# Patient Record
Sex: FEMALE | Race: WHITE | NOT HISPANIC OR LATINO
[De-identification: names, ages, dates, MRNs, and addresses within clinical notes are randomized per-mention and may not be internally consistent; named-entity substitution may affect disease eponyms.]

---

## 2018-05-18 ENCOUNTER — TRANSCRIPTION ENCOUNTER (OUTPATIENT)
Age: 28
End: 2018-05-18

## 2018-06-20 ENCOUNTER — APPOINTMENT (OUTPATIENT)
Dept: ORTHOPEDIC SURGERY | Facility: CLINIC | Age: 28
End: 2018-06-20
Payer: COMMERCIAL

## 2018-06-20 DIAGNOSIS — S00.93XA CONTUSION OF UNSPECIFIED PART OF HEAD, INITIAL ENCOUNTER: ICD-10-CM

## 2018-06-20 PROCEDURE — 99204 OFFICE O/P NEW MOD 45 MIN: CPT

## 2018-10-19 RX ORDER — NORETHINDRONE ACETATE AND ETHINYL ESTRADIOL, ETHINYL ESTRADIOL AND FERROUS FUMARATE 1MG-10(24)
1 MG-10 MCG / KIT ORAL
Qty: 28 | Refills: 0 | Status: COMPLETED | COMMUNITY
Start: 2017-08-24 | End: 2017-10-19

## 2024-12-27 ENCOUNTER — ASOB RESULT (OUTPATIENT)
Age: 34
End: 2024-12-27

## 2024-12-27 ENCOUNTER — APPOINTMENT (OUTPATIENT)
Dept: ANTEPARTUM | Facility: CLINIC | Age: 34
End: 2024-12-27
Payer: COMMERCIAL

## 2024-12-27 PROCEDURE — 76817 TRANSVAGINAL US OBSTETRIC: CPT

## 2024-12-27 PROCEDURE — 76805 OB US >/= 14 WKS SNGL FETUS: CPT

## 2025-01-30 ENCOUNTER — APPOINTMENT (OUTPATIENT)
Dept: ANTEPARTUM | Facility: CLINIC | Age: 35
End: 2025-01-30
Payer: COMMERCIAL

## 2025-01-30 ENCOUNTER — ASOB RESULT (OUTPATIENT)
Age: 35
End: 2025-01-30

## 2025-01-30 PROCEDURE — 76946 ECHO GUIDE FOR AMNIOCENTESIS: CPT

## 2025-01-30 PROCEDURE — 59000 AMNIOCENTESIS DIAGNOSTIC: CPT

## 2025-01-30 PROCEDURE — 76811 OB US DETAILED SNGL FETUS: CPT

## 2025-01-30 PROCEDURE — 76817 TRANSVAGINAL US OBSTETRIC: CPT

## 2025-06-13 ENCOUNTER — OUTPATIENT (OUTPATIENT)
Dept: OUTPATIENT SERVICES | Facility: HOSPITAL | Age: 35
LOS: 1 days | End: 2025-06-13
Payer: COMMERCIAL

## 2025-06-13 DIAGNOSIS — Z34.01 ENCOUNTER FOR SUPERVISION OF NORMAL FIRST PREGNANCY, FIRST TRIMESTER: ICD-10-CM

## 2025-06-13 PROCEDURE — 86901 BLOOD TYPING SEROLOGIC RH(D): CPT

## 2025-06-13 PROCEDURE — 86900 BLOOD TYPING SEROLOGIC ABO: CPT

## 2025-06-13 PROCEDURE — 86850 RBC ANTIBODY SCREEN: CPT

## 2025-06-16 ENCOUNTER — ASOB RESULT (OUTPATIENT)
Age: 35
End: 2025-06-16

## 2025-06-16 ENCOUNTER — EMERGENCY (EMERGENCY)
Facility: HOSPITAL | Age: 35
LOS: 1 days | End: 2025-06-16
Attending: EMERGENCY MEDICINE | Admitting: EMERGENCY MEDICINE
Payer: COMMERCIAL

## 2025-06-16 ENCOUNTER — APPOINTMENT (OUTPATIENT)
Dept: ANTEPARTUM | Facility: CLINIC | Age: 35
End: 2025-06-16
Payer: COMMERCIAL

## 2025-06-16 VITALS
TEMPERATURE: 98 F | HEART RATE: 78 BPM | OXYGEN SATURATION: 99 % | DIASTOLIC BLOOD PRESSURE: 75 MMHG | RESPIRATION RATE: 18 BRPM | SYSTOLIC BLOOD PRESSURE: 111 MMHG

## 2025-06-16 VITALS
OXYGEN SATURATION: 95 % | RESPIRATION RATE: 16 BRPM | HEART RATE: 90 BPM | SYSTOLIC BLOOD PRESSURE: 145 MMHG | WEIGHT: 136.91 LBS | HEIGHT: 64 IN | DIASTOLIC BLOOD PRESSURE: 81 MMHG | TEMPERATURE: 99 F

## 2025-06-16 LAB
ALBUMIN SERPL ELPH-MCNC: 4.5 G/DL — SIGNIFICANT CHANGE UP (ref 3.3–5)
ALP SERPL-CCNC: 49 U/L — SIGNIFICANT CHANGE UP (ref 40–120)
ALT FLD-CCNC: 14 U/L — SIGNIFICANT CHANGE UP (ref 10–45)
AST SERPL-CCNC: 19 U/L — SIGNIFICANT CHANGE UP (ref 10–40)
BILIRUB DIRECT SERPL-MCNC: <0.1 MG/DL — SIGNIFICANT CHANGE UP (ref 0–0.3)
BILIRUB INDIRECT FLD-MCNC: SIGNIFICANT CHANGE UP (ref 0.2–1)
BILIRUB SERPL-MCNC: 0.3 MG/DL — SIGNIFICANT CHANGE UP (ref 0.2–1.2)
HCG SERPL-ACNC: 94 MIU/ML — HIGH
PROT SERPL-MCNC: 7.7 G/DL — SIGNIFICANT CHANGE UP (ref 6–8.3)

## 2025-06-16 PROCEDURE — 76830 TRANSVAGINAL US NON-OB: CPT

## 2025-06-16 PROCEDURE — 84702 CHORIONIC GONADOTROPIN TEST: CPT

## 2025-06-16 PROCEDURE — 80076 HEPATIC FUNCTION PANEL: CPT

## 2025-06-16 PROCEDURE — 99284 EMERGENCY DEPT VISIT MOD MDM: CPT

## 2025-06-16 PROCEDURE — 99284 EMERGENCY DEPT VISIT MOD MDM: CPT | Mod: 25

## 2025-06-16 PROCEDURE — 76856 US EXAM PELVIC COMPLETE: CPT

## 2025-06-16 PROCEDURE — 36415 COLL VENOUS BLD VENIPUNCTURE: CPT

## 2025-06-16 NOTE — ED ADULT NURSE NOTE - NSFALLRISKASMTTYPE_ED_ALL_ED
Near Syncope, EKG Changes Near Syncope, EKG Changes Near Syncope, EKG Changes Near Syncope, EKG Changes Near Syncope, EKG Changes Initial (On Arrival)

## 2025-06-16 NOTE — ED PROVIDER NOTE - NSFOLLOWUPINSTRUCTIONS_ED_ALL_ED_FT
Methotrexate Treatment for an Ectopic Pregnancy  An outline of a person showing reproductive organs. A close-up shows a fetus outside of the uterus.  Methotrexate is a medicine that treats an ectopic pregnancy. In this type of pregnancy, the fertilized egg attaches (implants) outside the uterus. An ectopic pregnancy cannot develop into a healthy baby. Methotrexate works by stopping the growth of the fertilized egg. It also helps the body absorb tissue from the egg. This takes about 2–6 weeks.    An ectopic pregnancy can be life-threatening. However, most ectopic pregnancies can be successfully treated with methotrexate if they are diagnosed early.    Tell a health care provider about:  Any allergies you have.  All medicines you are taking, including vitamins, herbs, eye drops, creams, and over-the-counter medicines.  Any medical conditions you have.  What are the risks?  Your health care provider will talk with you about risks. These may include:  Digestive problems. You may have:  Nausea.  Vomiting.  Diarrhea.  Cramping in your abdomen.  Bleeding or spotting from your vagina.  Feeling dizzy or light-headed.  Mouth sores.  Inflammation of the lining of your lungs (pneumonitis).  Damage to nearby structures or organs, such as damage to the liver.  Hair loss.  There is a risk that methotrexate treatment will fail and the pregnancy will continue. There is also a risk that the ectopic pregnancy might tear or burst (rupture) during use of this medicine.    What happens before the procedure?  Blood tests will be done to check how your disease-fighting system (immune system), liver, and kidneys are working.  You will also have blood tests to measure your pregnancy hormone levels and to find out your blood type.  You will be given a shot of a medicine called Rho(D) immune globulin if:  You are Rh-negative and the father is Rh-positive.  You are Rh-negative and the father's Rh type is unknown.  What happens during the procedure?  A person giving another person an injection in the outer thigh.  Methotrexate will be injected into your muscle.  Methotrexate may be given as a single dose of medicine or a series of doses over time, depending on your response to the treatment.  Methotrexate injections are given by a health care provider. Injection is the most common way that this medicine is used to treat an ectopic pregnancy.  You may also receive other medicines to manage your ectopic pregnancy.  The procedure may vary among health care providers and hospitals.    What happens after the procedure?  After your treatment, it is common to have:  No immediate side effects.  Blood tests will be taken at timed intervals for several days or weeks to check your pregnancy hormone levels. The blood tests will continue until the pregnancy hormone can no longer be found in the blood.    Summary  Methotrexate is a medicine that treats an ectopic pregnancy. This type of pregnancy forms outside the uterus.  There is a risk that methotrexate treatment will fail and the pregnancy will continue. There is also a risk that the ectopic pregnancy might tear or burst while using this medicine.  This medicine may be given in a single dose or a series of doses over time.  After your treatment, blood tests will be done at timed intervals for several days or weeks to check your pregnancy hormone levels. The blood tests will continue until the pregnancy hormone can no longer be found in the blood.  This information is not intended to replace advice given to you by your health care provider. Make sure you discuss any questions you have with your health care provider. You were referred to the ED after outpatient ultrasound concerning for ectopic pregnancy. Your HCG level is declining from 165 on 6/13 to 106 earlier today, and 94 in the ED today. You were seen and evaluated by Ob/gyn and offered treatment with Methotrexate, which you have declined today.     RETURN TO THIS ED THE MORNING OF 6/18 FOR REPEAT HCG.  RETURN IMMEDIATELY FOR SEVERE ABDOMINAL PAIN, HEAVY VAGINAL BLEEDING (MORE THAN 2 PADS PER HOUR), CHEST PAIN, SHORTNESS OF BREATH, FEELING FAINT, OR OTHER CONCERNING SYMPTOMS.     Ectopic Pregnancy    WHAT YOU NEED TO KNOW:    What is an ectopic pregnancy? Ectopic pregnancy occurs when a fertilized egg attaches and begins to grow outside of the uterus. The most common place for this to happen is in the fallopian tube. This is sometimes called a tubal pregnancy. The egg can also implant on the outside of the uterus, on the ovary or cervix, or in the abdomen. The egg may begin to grow, but the pregnancy cannot continue normally. Ectopic pregnancy can cause heavy bleeding and may be life-threatening.    What increases my risk for an ectopic pregnancy?    Age older than 35 years    An infection of the reproductive organs such as pelvic inflammatory disease (PID)    A past ectopic pregnancy, or past fertility problems    Fallopian tube injury or damage    Getting pregnant when you have an intrauterine device (IUD)    Past surgery in your abdomen or on your reproductive organs    Medicines to treat infertility or that contain female hormones    Certain fertility treatments, such as multiple embryo transplant    Smoking cigarettes  What are the signs and symptoms of ectopic pregnancy?    One-sided abdominal or pelvic pain and cramping    Vaginal bleeding or spotting that happens about 7 weeks after your missed period    Nausea or vomiting    Dizziness, weakness, or fainting    Tissue coming out of your vagina  How is ectopic pregnancy diagnosed? Your healthcare provider will examine you and ask about other medical conditions or past surgeries. Tell the provider about any previous pregnancies, miscarriages, infertility treatments, and sexually transmitted infections (STIs). You may need any of the following:    A pelvic exam is used to check the size and shape of your uterus, cervix, and ovaries.    Blood and urine tests will show if you are currently pregnant, or if you have infections or other problems.    Ultrasound pictures may be taken of the inside of your uterus, ovaries, and abdomen. An ultrasound is usually done over your abdomen, but you may also need a vaginal ultrasound. During a vaginal ultrasound, a small tube is placed into your vagina. This can help healthcare providers see areas that may be hard to see during an abdominal ultrasound.  How is ectopic pregnancy treated? Most ectopic pregnancies will need immediate treatment. Your body may absorb the pregnancy tissues and your symptoms may decrease without any treatment. You may need any of the following:    Medicine called methotrexate may be given to stop the pregnancy. This may be given as an injection. You may need more than one dose. It is important to follow up with your healthcare provider as directed if you receive this medicine.    Surgery may be done to repair or remove tissue or ruptured fallopian tubes. Your healthcare provider will talk to you about possible kinds of surgery. Your provider will consider where the ectopic pregnancy is located and the damage it caused. Talk to your provider about your desire to have children in the future. Some kinds of surgery will prevent future pregnancy.  Where can I get support and more information?    The [employer]  [address] [address]  [place] [address]  Phone: [phone]  Phone: 1-697.491.8867  Web Address: [email or web address]  Call your local emergency number (911 in the ) if:    You have chest pain or trouble breathing.    Call your doctor if:    You have sharp pain in your lower abdomen that is severe and starts suddenly.    You feel lightheaded or like you are going to faint.    You have increasing abdominal or pelvic pain.    You have new or worsening vaginal bleeding.    You have shoulder pain.    You have a fever.    You have questions or concerns about your condition or care.  CARE AGREEMENT:    You have the right to help plan your care. Learn about your health condition and how it may be treated. Discuss treatment options with your healthcare providers to decide what care you want to receive. You always have the right to refuse treatment.        Methotrexate Treatment for an Ectopic Pregnancy  An outline of a person showing reproductive organs. A close-up shows a fetus outside of the uterus.  Methotrexate is a medicine that treats an ectopic pregnancy. In this type of pregnancy, the fertilized egg attaches (implants) outside the uterus. An ectopic pregnancy cannot develop into a healthy baby. Methotrexate works by stopping the growth of the fertilized egg. It also helps the body absorb tissue from the egg. This takes about 2–6 weeks.    An ectopic pregnancy can be life-threatening. However, most ectopic pregnancies can be successfully treated with methotrexate if they are diagnosed early.    Tell a health care provider about:  Any allergies you have.  All medicines you are taking, including vitamins, herbs, eye drops, creams, and over-the-counter medicines.  Any medical conditions you have.  What are the risks?  Your health care provider will talk with you about risks. These may include:  Digestive problems. You may have:  Nausea.  Vomiting.  Diarrhea.  Cramping in your abdomen.  Bleeding or spotting from your vagina.  Feeling dizzy or light-headed.  Mouth sores.  Inflammation of the lining of your lungs (pneumonitis).  Damage to nearby structures or organs, such as damage to the liver.  Hair loss.  There is a risk that methotrexate treatment will fail and the pregnancy will continue. There is also a risk that the ectopic pregnancy might tear or burst (rupture) during use of this medicine.    What happens before the procedure?  Blood tests will be done to check how your disease-fighting system (immune system), liver, and kidneys are working.  You will also have blood tests to measure your pregnancy hormone levels and to find out your blood type.  You will be given a shot of a medicine called Rho(D) immune globulin if:  You are Rh-negative and the father is Rh-positive.  You are Rh-negative and the father's Rh type is unknown.  What happens during the procedure?  A person giving another person an injection in the outer thigh.  Methotrexate will be injected into your muscle.  Methotrexate may be given as a single dose of medicine or a series of doses over time, depending on your response to the treatment.  Methotrexate injections are given by a health care provider. Injection is the most common way that this medicine is used to treat an ectopic pregnancy.  You may also receive other medicines to manage your ectopic pregnancy.  The procedure may vary among health care providers and hospitals.    What happens after the procedure?  After your treatment, it is common to have:  No immediate side effects.  Blood tests will be taken at timed intervals for several days or weeks to check your pregnancy hormone levels. The blood tests will continue until the pregnancy hormone can no longer be found in the blood.    Summary  Methotrexate is a medicine that treats an ectopic pregnancy. This type of pregnancy forms outside the uterus.  There is a risk that methotrexate treatment will fail and the pregnancy will continue. There is also a risk that the ectopic pregnancy might tear or burst while using this medicine.  This medicine may be given in a single dose or a series of doses over time.  After your treatment, blood tests will be done at timed intervals for several days or weeks to check your pregnancy hormone levels. The blood tests will continue until the pregnancy hormone can no longer be found in the blood.  This information is not intended to replace advice given to you by your health care provider. Make sure you discuss any questions you have with your health care provider.

## 2025-06-16 NOTE — ED PROVIDER NOTE - PATIENT PORTAL LINK FT
You can access the FollowMyHealth Patient Portal offered by [facility] by registering at the following website: [email or web address]. By joining [employer]’s FollowMyHealth portal, you will also be able to view your health information using other applications (apps) compatible with our system.

## 2025-06-16 NOTE — ED PROVIDER NOTE - NS ED ROS FT
Constitutional: No fever or chills.   Cardiac: No chest pain, SOB   GI: No nausea, vomiting, diarrhea  Except as documented in the HPI, all other systems are negative.

## 2025-06-16 NOTE — CONSULT NOTE ADULT - ASSESSMENT
35yo  @6w1d by LMP presenting from [facility] ultrasound noted to have echogenic mass adjacent to right ovary concerning for right tubal ectopic.  - US highly suspicious for ectopic pregnancy.    - Patient clinically and hemodynamically stable. Bleeding is minimal, vitals are stable and patient is asymptomatic.   - BHCG downtrending 165>106  - Pending repeat BHCG, CBC  - Recommend updated T&S  - Evaluation pending the above items.        33yo  @6w1d by LMP presenting from [facility] ultrasound noted to have echogenic mass adjacent to right ovary concerning for right tubal ectopic.  - US highly suspicious for ectopic pregnancy.    - Patient clinically and hemodynamically stable. Bleeding is minimal, vitals are stable and patient is asymptomatic.   - BHCG downtrending 165>106  - Pending repeat BHCG, CBC  - Recommend updated T&S  - Evaluation pending the above items.     Addendum:     Patient seen at bedside to share results of bHCG. Downtrended to 94 from 106. Patient counseled on risks and benefits of expectant management. Given her bHCG is downtrending, w/o symptoms iso patient who is highly compliant, patient is a good candidate at this time for expectant management. A+, no rhogam required.   - Patient counseled on return precautions- including sharp abdominal pain, dizziness, palpitations, CP, SOB, heavy vaginal bleeding, N/V. Patient and parents at bedside confirm their understanding of counseling. Patient understands required follow up and risk of rupture even at this level of bHCG  - Patient to return in 48 hours for repeat bHCG.     Plan discussed w/ Dr. Tho Ramirez PA-C

## 2025-06-16 NOTE — ED PROVIDER NOTE - PROGRESS NOTE DETAILS
GYN GYN has seen in the ED, pending LFTs and repeat HCG for recs Downtrending HCG. D/w GYN at length. Pt prefers expectant management, rather than MTX at this time. Pt to return to the ED Wednesday morning for repeat HCG. Strict return precautions

## 2025-06-16 NOTE — ED ADULT NURSE NOTE - CCCP TRG CHIEF CMPLNT
Medication:   Requested Prescriptions     Pending Prescriptions Disp Refills    amLODIPine (NORVASC) 10 MG tablet [Pharmacy Med Name: AMLODIPINE BESYLATE 10 MG TAB] 90 tablet 0     Sig: TAKE 1 TABLET BY MOUTH EVERY DAY     Last Filled:  10/30/2025    Last appt: 11/19/2024   Next appt: 3/13/2025    Last OARRS:        No data to display              
vaginal bleeding

## 2025-06-16 NOTE — ED PROVIDER NOTE - PHYSICAL EXAMINATION
Constitutional: Well appearing, awake, alert, oriented to person, place, time/situation and in no apparent distress.  ENMT: Airway patent.   Eyes: Clear bilaterally  Cardiac: Normal rate, regular rhythm.   Respiratory: Breathes easily. No increased WOB, tachypnea, hypoxia, or accessory mm use. Pt speaks in full sentences.   Gastrointestinal: Abd soft, NT, ND. No guarding, rebound, or rigidity.   Musculoskeletal: Range of motion is not limited  Neuro: Alert and oriented x 3, face symmetric and speech fluent. Nml gross motor movement, grossly non focal   Skin: Skin normal color for race, warm, dry and intact. No evidence of rash.  Psych: Alert and oriented to person, place, time/situation. normal mood and affect. no apparent risk to self or others.

## 2025-06-16 NOTE — ED ADULT NURSE NOTE - OBJECTIVE STATEMENT
33 y/o female presents to the ED c/o vaginal bleeding x1 week, sent by outpatient OBGYN to r/o ectopic pregnancy. Pt is . Denies CP, SOB, HA, F/C, N/V/D, weakness, dizziness, and any other complaints at this time. On exam A&Ox4, RA, ambulatory, NAD. Speaking in clear coherent sentences, respirations are spontaneous and unlabored. No obvious signs of injury, PERALTA.

## 2025-06-16 NOTE — CONSULT NOTE ADULT - SUBJECTIVE AND OBJECTIVE BOX
33yo  @6w1d by LMP presenting from [facility] ultrasound noted to have echogenic mass adjacent to right ovary concerning for right tubal ectopic. Patient endorses bleeding similar to a regular period x1 week. States the bleeding has become lighter over the past few days. States she has had on the same pad all day with minimal brown blood. Endorses mild cramping. Pt denies fever, chills, chest pain, SOB, abdominal pain, nausea, vomiting, vaginal bleeding. Patient denies dizziness/lightheadedness. Previously saw her OB on  and HCG at that time was 165.     OB: G1- D&E  fetal genital abn; G2- current  GYN: Denies, LMP 25, regular periods  PMH: Denies  PSH: Denies  Meds: Denies  Allergies: PCN-hives    PHYSICAL EXAM:   Vital Signs Last 24 Hrs  T(C): 37.3 (2025 16:20), Max: 37.3 (2025 16:20)  T(F): 99.2 (2025 16:20), Max: 99.2 (2025 16:20)  HR: 90 (2025 16:20) (90 - 90)  BP: 145/81 (2025 16:20) (145/81 - 145/81)  BP(mean): --  RR: 16 (2025 16:20) (16 - 16)  SpO2: 95% (2025 16:20) (95% - 95%)    Parameters below as of 2025 16:20  Patient On (Oxygen Delivery Method): room air        **************************  Constitutional: No acute distress  Respiratory: Breathing well on RA  Gastrointestinal: soft, non tender, no rebound or guarding   : scant brown blood on pad.  Extremities: no calf tenderness or swelling    LABS:     on    on   Type A positive         RADIOLOGY & ADDITIONAL STUDIES:   TVUS : The left ovary appears normal, with a corpus luteum. The right ovary appears normal, with multiple small follicles. Adjacent to the right ovary there is an echogenic masses noted measuring 1.6 x 1.4 x 1.7 cm (no yolk sac or fetal pole was seen within). It could be  from wale ovary and appears medially to it. Doppler studies revealed circumferential flow. This is highly suspicious of a right tubal ectopic pregnancy. There is no free fluid in the cul de sac.

## 2025-06-16 NOTE — ED ADULT TRIAGE NOTE - CHIEF COMPLAINT QUOTE
, referred to ED by GYN to r/o ectopic pregnancy. Vaginal bleeding x 7 days. "Feels like a regular period." Hx of positive home pregnancy tests.

## 2025-06-16 NOTE — ED ADULT NURSE NOTE - CAS EDN INTEG ASSESS
Requested Prescriptions     Pending Prescriptions Disp Refills    folic acid (FOLVITE) 1 MG tablet [Pharmacy Med Name: FOLIC ACID TABS 1MG] 90 tablet 3     Sig: TAKE 1 TABLET DAILY     Recent Visits  Date Type Provider Dept   11/08/21 Office Visit Christina Eddy MD [facility]   09/20/21 Office Visit Eddi Thrasher APRN - CNP [facility]   06/08/21 Office Visit Christina Eddy MD [facility]   01/14/21 Office Visit Laverne Rausch MD [facility]   Showing recent visits within past 540 days with a meds authorizing provider and meeting all other requirements  Future Appointments  No visits were found meeting these conditions.   Showing future appointments within next 150 days with a meds authorizing provider and meeting all other requirements       LAST APPOINTMENT  11/8/2021 - - -

## 2025-06-18 ENCOUNTER — EMERGENCY (EMERGENCY)
Facility: HOSPITAL | Age: 35
LOS: 1 days | End: 2025-06-18
Admitting: STUDENT IN AN ORGANIZED HEALTH CARE EDUCATION/TRAINING PROGRAM
Payer: COMMERCIAL

## 2025-06-18 VITALS — WEIGHT: 137.35 LBS

## 2025-06-18 VITALS
OXYGEN SATURATION: 99 % | DIASTOLIC BLOOD PRESSURE: 81 MMHG | TEMPERATURE: 98 F | HEART RATE: 65 BPM | HEIGHT: 64 IN | SYSTOLIC BLOOD PRESSURE: 122 MMHG | RESPIRATION RATE: 18 BRPM

## 2025-06-18 LAB
ALBUMIN SERPL ELPH-MCNC: 4.3 G/DL — SIGNIFICANT CHANGE UP (ref 3.3–5)
ALP SERPL-CCNC: 46 U/L — SIGNIFICANT CHANGE UP (ref 40–120)
ALT FLD-CCNC: 12 U/L — SIGNIFICANT CHANGE UP (ref 10–45)
ANION GAP SERPL CALC-SCNC: 9 MMOL/L — SIGNIFICANT CHANGE UP (ref 5–17)
AST SERPL-CCNC: 15 U/L — SIGNIFICANT CHANGE UP (ref 10–40)
BILIRUB DIRECT SERPL-MCNC: 0.1 MG/DL — SIGNIFICANT CHANGE UP (ref 0–0.3)
BILIRUB INDIRECT FLD-MCNC: 0.4 MG/DL — SIGNIFICANT CHANGE UP (ref 0.2–1)
BILIRUB SERPL-MCNC: 0.5 MG/DL — SIGNIFICANT CHANGE UP (ref 0.2–1.2)
BLD GP AB SCN SERPL QL: NEGATIVE — SIGNIFICANT CHANGE UP
BUN SERPL-MCNC: 10 MG/DL — SIGNIFICANT CHANGE UP (ref 7–23)
CALCIUM SERPL-MCNC: 9.1 MG/DL — SIGNIFICANT CHANGE UP (ref 8.4–10.5)
CHLORIDE SERPL-SCNC: 104 MMOL/L — SIGNIFICANT CHANGE UP (ref 96–108)
CO2 SERPL-SCNC: 24 MMOL/L — SIGNIFICANT CHANGE UP (ref 22–31)
CREAT SERPL-MCNC: 0.54 MG/DL — SIGNIFICANT CHANGE UP (ref 0.5–1.3)
EGFR: 125 ML/MIN/1.73M2 — SIGNIFICANT CHANGE UP
EGFR: 125 ML/MIN/1.73M2 — SIGNIFICANT CHANGE UP
GLUCOSE SERPL-MCNC: 88 MG/DL — SIGNIFICANT CHANGE UP (ref 70–99)
HCG SERPL-ACNC: 135 MIU/ML — HIGH
HCT VFR BLD CALC: 39.1 % — SIGNIFICANT CHANGE UP (ref 34.5–45)
HGB BLD-MCNC: 12.6 G/DL — SIGNIFICANT CHANGE UP (ref 11.5–15.5)
MCHC RBC-ENTMCNC: 26.6 PG — LOW (ref 27–34)
MCHC RBC-ENTMCNC: 32.2 G/DL — SIGNIFICANT CHANGE UP (ref 32–36)
MCV RBC AUTO: 82.7 FL — SIGNIFICANT CHANGE UP (ref 80–100)
NRBC # BLD AUTO: 0 K/UL — SIGNIFICANT CHANGE UP (ref 0–0)
NRBC # FLD: 0 K/UL — SIGNIFICANT CHANGE UP (ref 0–0)
NRBC BLD AUTO-RTO: 0 /100 WBCS — SIGNIFICANT CHANGE UP (ref 0–0)
PLATELET # BLD AUTO: 212 K/UL — SIGNIFICANT CHANGE UP (ref 150–400)
PMV BLD: 11.6 FL — SIGNIFICANT CHANGE UP (ref 7–13)
POTASSIUM SERPL-MCNC: 4.4 MMOL/L — SIGNIFICANT CHANGE UP (ref 3.5–5.3)
POTASSIUM SERPL-SCNC: 4.4 MMOL/L — SIGNIFICANT CHANGE UP (ref 3.5–5.3)
PROT SERPL-MCNC: 7.1 G/DL — SIGNIFICANT CHANGE UP (ref 6–8.3)
RBC # BLD: 4.73 M/UL — SIGNIFICANT CHANGE UP (ref 3.8–5.2)
RBC # FLD: 13.2 % — SIGNIFICANT CHANGE UP (ref 10.3–14.5)
RH IG SCN BLD-IMP: POSITIVE — SIGNIFICANT CHANGE UP
SODIUM SERPL-SCNC: 137 MMOL/L — SIGNIFICANT CHANGE UP (ref 135–145)
WBC # BLD: 5.55 K/UL — SIGNIFICANT CHANGE UP (ref 3.8–10.5)
WBC # FLD AUTO: 5.55 K/UL — SIGNIFICANT CHANGE UP (ref 3.8–10.5)

## 2025-06-18 PROCEDURE — 80076 HEPATIC FUNCTION PANEL: CPT

## 2025-06-18 PROCEDURE — 86900 BLOOD TYPING SEROLOGIC ABO: CPT

## 2025-06-18 PROCEDURE — 80048 BASIC METABOLIC PNL TOTAL CA: CPT

## 2025-06-18 PROCEDURE — 99284 EMERGENCY DEPT VISIT MOD MDM: CPT | Mod: 25

## 2025-06-18 PROCEDURE — 86850 RBC ANTIBODY SCREEN: CPT

## 2025-06-18 PROCEDURE — 96401 CHEMO ANTI-NEOPL SQ/IM: CPT

## 2025-06-18 PROCEDURE — 99283 EMERGENCY DEPT VISIT LOW MDM: CPT

## 2025-06-18 PROCEDURE — 86901 BLOOD TYPING SEROLOGIC RH(D): CPT

## 2025-06-18 PROCEDURE — 84702 CHORIONIC GONADOTROPIN TEST: CPT

## 2025-06-18 PROCEDURE — 99285 EMERGENCY DEPT VISIT HI MDM: CPT

## 2025-06-18 PROCEDURE — 85027 COMPLETE CBC AUTOMATED: CPT

## 2025-06-18 PROCEDURE — 36415 COLL VENOUS BLD VENIPUNCTURE: CPT

## 2025-06-18 RX ORDER — METHOTREXATE 25 MG/ML
84 INJECTION, SOLUTION INTRA-ARTERIAL; INTRAMUSCULAR; INTRATHECAL; INTRAVENOUS ONCE
Refills: 0 | Status: COMPLETED | OUTPATIENT
Start: 2025-06-18 | End: 2025-06-18

## 2025-06-18 RX ADMIN — METHOTREXATE 84 MILLIGRAM(S): 25 INJECTION, SOLUTION INTRA-ARTERIAL; INTRAMUSCULAR; INTRATHECAL; INTRAVENOUS at 11:34

## 2025-06-18 NOTE — CONSULT NOTE ADULT - SUBJECTIVE AND OBJECTIVE BOX
Patient is a 34 year old  at 6w3d by LMP (25) presenting w/ concern for ectopic pregnancy, for repeat bHCG.   Pt reports she was sent to  ultrasound unit after her bHCG was noted to be rising abnormally outpatient. Pt went to  and ultrasound notable for echogenic mass adjacent to right ovary concerning for R tubal ectopic pregnancy. Pt reports light bleeding x 1 week that has now resolved. Pt reports feeling completely herself otherwise.     Pt denies fever, chills, chest pain, SOB, dizziness, abdominal pain, nausea, vomiting, cramping, vaginal bleeding, dysuria, hematuria.       OB Hx: D&E  fetal genetic abnormalities  GYN Hx: Denies fibroids, cysts, STIs, abnormal pap smears  Last PAP smear: WNL    PMHx: Denies  SHx: Denies  Meds: Denies  Allergies: PCN- hives    Social hx: Lives with her fiance    PHYSICAL EXAM:   Vital Signs Last 24 Hrs  T(C): 36.7 (2025 07:53), Max: 36.7 (2025 07:53)  T(F): 98.1 (2025 07:53), Max: 98.1 (2025 07:53)  HR: 65 (2025 07:53) (65 - 65)  BP: 122/81 (2025 07:53) (122/81 - 122/81)  BP(mean): --  RR: 18 (2025 07:53) (18 - 18)  SpO2: 99% (2025 07:53) (99% - 99%)    Parameters below as of 2025 07:53  Patient On (Oxygen Delivery Method): room air        **************************  Constitutional: No acute distress  Respiratory: No work of breathing noted.   Gastrointestinal: soft, non tender, no rebound or guarding   Extremities: no calf tenderness or swelling    LABS:        TPro  7.7  /  Alb  4.5  /  TBili  0.3  /  DBili  <0.1  /  AST  19  /  ALT  14  /  AlkPhos  49  -16        HCG Quantitative, Serum: 135 mIU/mL ( @ 08:00)      RADIOLOGY & ADDITIONAL STUDIES:         ----------------------------------------------------------------------   Gynecological Report               (Signed Final 2025 04:09 pm)  ----------------------------------------------------------------------  Patient Info     ID #:       [id number]                      :  [birth year] (34 yrs)(F)   Name:       ROSLYN                    Visit Date: 2025 03:32 pm               VEZYRAKIS  ----------------------------------------------------------------------  Performed By     Attending:        Roosevelt Patel M.D.   Performed By:     Letitia Dooley RDMS   Referred By:      Kim Sandoval MD   Ref. Address:     [facility]   Location:         [facility]   Visit Type:       GYN  ----------------------------------------------------------------------  Service(s) Provided     GYN   Transabdominal + Transvaginal  ----------------------------------------------------------------------  Indications     Rule Out Ectopic - Bleeding/Hemorrhage         O20.9   Occuring in Early Pregnancy  ----------------------------------------------------------------------  History     Age:   34   LMP:   27            Day Of Cycle:   44  ----------------------------------------------------------------------  Uterus     Uterus:     Present   Position:   Anteverted   Size (cm)    L:  5.35      W:   4.77       H:  4.27   Description:   Normal appearance  ----------------------------------------------------------------------  Endometrium     Endometrium:          Normal appearance   Thickness(mm):        8.13   Echotexture:          Homogeneous    ----------------------------------------------------------------------  Cervix     Normal appearance  ----------------------------------------------------------------------  Cul-De-Sac     No fluid was visualized  ----------------------------------------------------------------------  Right Ovary     Status:   Visualized   Size (cm)    L:  2.81      W:   2.76       H:  1.47   Vol (ml):    5.97   Morphology:    Normal appearance  ----------------------------------------------------------------------  Left Ovary     Status:   Visualized   Size (cm)    L:  3.41      W:   3.06       H:  1.62   Vol (ml):    8.85   Morphology:    Normal appearance  ----------------------------------------------------------------------  Comments     Transabdominal and transvaginal ultrasound of the   pelvis were performed to assure adequate visualization   of the pelvic organs.   The uterine cervix appears normal with no suspected   pathology.   A normal size anteverted  uterus was noted.  The   myometrium has homogeneous echotexture.  It is free   of leiomyomas or any other lesions.   The endometrial lining is measuring 8.1 cm. There is   no evidence of IUP.     The left ovary appears normal, with a corpus luteum.   The right ovary appears normal, with multiple small   follicles. Adjacent to the right ovary there is an   echogenic masses  noted measuring 1.6 x 1.4 x 1.7 cm   (no yolk sac or fetal pole was seen within). It could be    from wale ovary and appears medially to it.   Doppler studies revealed circumferential flow. This is   highly suspicious of a right tubal ectopic pregnancy.   There is no free fluid in the cul de sac.     Reconstruction of the coronal uterine plane was   performed using 3D ultrasound demonstrating a normal   uterine cavity.  ----------------------------------------------------------------------  Diagnosis     Suspected ectopic                                   Discussed with    pregnancy in right tube                             [place] and the                                                       patient who was                                                       escorted to  for                                                       further assessment                                                       and treatment  ----------------------------------------------------------------------                     Roosevelt Patel  Electronically Signed Final Report   2025 04:09 pm  ----------------------------------------------------------------------

## 2025-06-18 NOTE — ED ADULT NURSE NOTE - NSFALLUNIVINTERV_ED_ALL_ED
Bed/Stretcher in lowest position, wheels locked, appropriate side rails in place/Call bell, personal items and telephone in reach/Instruct patient to call for assistance before getting out of bed/chair/stretcher/Non-slip footwear applied when patient is off stretcher/[place] to call system/Physically safe environment - no spills, clutter or unnecessary equipment/Purposeful proactive rounding/Room/bathroom lighting operational, light cord in reach

## 2025-06-18 NOTE — ED PROVIDER NOTE - CLINICAL SUMMARY MEDICAL DECISION MAKING FREE TEXT BOX
Suspected right tubal ectopic pregnancy, no symptoms to suggest ruptured.  HD stable and well-appearing.    Will check HCG and have GYN consult.

## 2025-06-18 NOTE — CONSULT NOTE ADULT - ASSESSMENT
Patient is a 34 year old  at 6w3d by LMP (25) presenting w/ concern for ectopic pregnancy, for repeat bHCG. VSS. bHCG trend as follows: 165 () > 106 () > 94 ( evening)>135 (). Patient had been counseled on expectant management vs. methotrexate on  and patient opted for expectant management given her bHCG was  <200 and she was asymptomatic. As the bHCG has now uptrended again inappropriately, patient counseled on methotrexate    - full labs to rule out any contraindications. F/u full labs.   - INCOMPLETE Patient is a 34 year old  at 6w3d by LMP (25) presenting w/ concern for ectopic pregnancy, for repeat bHCG. VSS. bHCG trend as follows: 165 () > 106 () > 94 ( evening)>135 (). Patient had been counseled on expectant management vs. methotrexate on  and patient opted for expectant management given her bHCG was  <200 and she was asymptomatic. As the bHCG has now uptrended again inappropriately, patient counseled on methotrexate. Full labs done to rule out any contraindications to MTX. Rh+, no rhogam required.   [] Pt counseled on medical vs surgical treatment options for ectopic pregnancy. Risks of MTX including but not limited to GI complaints, possible rupture of ectopic, possible need for re-dose, and possible need for surgery discussed. Risk of surgery including infection, bleeding, damage to adjacent organs, scarring with salpingostomy, and possible removal of fallopian tube.   [] Pt is a candidate for Methotrexate tx. Methotrexate 84 mg IM once into R buttock. Pt does not have contraindications to MTX including blood dyscrasia, active pulmonary disease, hepatic /renal disease, or hematologic dysfunstion. Pt was counseled on avoiding folic acid containing foods, prenatal vitamins, alcohol, breastfeeding, as well as intercourse. Pt understands she cannot attempt to conceive for at least 3 months.   [] Pt counseled on return precautions including (but not limited to) heavy vaginal bleeding, sharp abdominal/pelvic pain, dizziness, and syncope  [] Pt to follow up on  for repeat bHCG check.    [] MTX consent signed w/ Dr. cSott.     Plan discussed w/ Dr. Tyler Ramirez PA-C

## 2025-06-18 NOTE — ED ADULT TRIAGE NOTE - SPO2 (%)
9.0    5.16  )-----------( 229      ( 30 Aug 2019 08:17 )             28.6       CBC Full  -  ( 30 Aug 2019 08:17 )  WBC Count : 5.16 K/uL  RBC Count : 2.93 M/uL  Hemoglobin : 9.0 g/dL  Hematocrit : 28.6 %  Platelet Count - Automated : 229 K/uL  Mean Cell Volume : 97.6 fl  Mean Cell Hemoglobin : 30.7 pg  Mean Cell Hemoglobin Concentration : 31.5 gm/dL  Auto Neutrophil # : x  Auto Lymphocyte # : x  Auto Monocyte # : x  Auto Eosinophil # : x  Auto Basophil # : x  Auto Neutrophil % : x  Auto Lymphocyte % : x  Auto Monocyte % : x  Auto Eosinophil % : x  Auto Basophil % : x      08-29    140  |  107  |  22  ----------------------------<  96  3.8   |  27  |  1.06    Ca    8.9      29 Aug 2019 19:11    TPro  7.7  /  Alb  3.9  /  TBili  0.5  /  DBili  x   /  AST  42<H>  /  ALT  61  /  AlkPhos  56  08-29      LIVER FUNCTIONS - ( 29 Aug 2019 19:11 )  Alb: 3.9 g/dL / Pro: 7.7 gm/dL / ALK PHOS: 56 U/L / ALT: 61 U/L / AST: 42 U/L / GGT: x             PT/INR - ( 29 Aug 2019 19:11 )   PT: 11.9 sec;   INR: 1.07 ratio                           MEDICATIONS  (STANDING):  atorvastatin 10 milliGRAM(s) Oral at bedtime  clopidogrel Tablet 75 milliGRAM(s) Oral daily  dextrose 5% + sodium chloride 0.9%. 1000 milliLiter(s) (75 mL/Hr) IV Continuous <Continuous>  labetalol 400 milliGRAM(s) Oral two times a day  pantoprazole    Tablet 40 milliGRAM(s) Oral before breakfast  sodium chloride 0.9%. 500 milliLiter(s) (100 mL/Hr) IV Continuous <Continuous> 99

## 2025-06-18 NOTE — ED PROVIDER NOTE - OBJECTIVE STATEMENT
34 F with VB for past week; TVUS two days ago concerning for possible tubal ectopic pregnancy; HCG was downtrending from low 100s to 90s.   Pt here for repeat HCG; reports barely a "twinge" of occasional discomfort, but says it may because she is "hyperaware" of concern for ectopic.  Bleeding has decreased to minimal light spotting of dark blood.  No severe abd cramps, no nausea/vomiting, not weak/lightheaded, no heavy bleeding.  MTX was discussed at last visit and she says she will consider it as indicated.

## 2025-06-18 NOTE — ED PROVIDER NOTE - PATIENT PORTAL LINK FT
You can access the FollowMyHealth Patient Portal offered by [facility] by registering at the following website: [email or web address]. By joining [employer]’s FollowMyHealth portal, you will also be able to view your health information using other applications (apps) compatible with our system.

## 2025-06-18 NOTE — ED ADULT NURSE NOTE - IN THE PAST 12 MONTHS HAVE YOU USED DRUGS OTHER THAN THOSE REQUIRED FOR MEDICAL REASON?
- prominent features include hypotension and bradycardia  - ECG with bradyarrhythmia; patient received fluid in the ED  - IV atropine, IV glucagon (bolus followed by gtt) and IV calcium  - TVP initially discussed with Dr. Alem Miller: recommended atropine 1 mg IV  - TVP placed post-ROSC with base rate set at 60 BPM  - f/u TTE with CFD  - hold BB, CCB and flecainide     No

## 2025-06-18 NOTE — ED PROVIDER NOTE - NSFOLLOWUPINSTRUCTIONS_ED_ALL_ED_FT
Do not attempt to conceive for at least 3 months.   Avoid folic acid containing foods, prenatal vitamins, alcohol, breastfeeding, as well as intercourse.    Return to ER for repeat bHCG test on June 21st.  Return to ER immediately for: heavy vaginal bleeding, sharp abdominal/pelvic pain, dizziness/feeling faint, or other concerning symptoms.   ===============  Methotrexate Treatment for an Ectopic Pregnancy  An outline of a person showing reproductive organs. A close-up shows a fetus outside of the uterus.  Methotrexate is a medicine that treats an ectopic pregnancy. In this type of pregnancy, the fertilized egg attaches (implants) outside the uterus. An ectopic pregnancy cannot develop into a healthy baby. Methotrexate works by stopping the growth of the fertilized egg. It also helps the body absorb tissue from the egg. This takes about 2–6 weeks.    An ectopic pregnancy can be life-threatening. However, most ectopic pregnancies can be successfully treated with methotrexate if they are diagnosed early.    Tell a health care provider about:  Any allergies you have.  All medicines you are taking, including vitamins, herbs, eye drops, creams, and over-the-counter medicines.  Any medical conditions you have.  What are the risks?  Your health care provider will talk with you about risks. These may include:  Digestive problems. You may have:  Nausea.  Vomiting.  Diarrhea.  Cramping in your abdomen.  Bleeding or spotting from your vagina.  Feeling dizzy or light-headed.  Mouth sores.  Inflammation of the lining of your lungs (pneumonitis).  Damage to nearby structures or organs, such as damage to the liver.  Hair loss.  There is a risk that methotrexate treatment will fail and the pregnancy will continue. There is also a risk that the ectopic pregnancy might tear or burst (rupture) during use of this medicine.    What happens before the procedure?  Blood tests will be done to check how your disease-fighting system (immune system), liver, and kidneys are working.  You will also have blood tests to measure your pregnancy hormone levels and to find out your blood type.  You will be given a shot of a medicine called Rho(D) immune globulin if:  You are Rh-negative and the father is Rh-positive.  You are Rh-negative and the father's Rh type is unknown.  What happens during the procedure?  A person giving another person an injection in the outer thigh.  Methotrexate will be injected into your muscle.  Methotrexate may be given as a single dose of medicine or a series of doses over time, depending on your response to the treatment.  Methotrexate injections are given by a health care provider. Injection is the most common way that this medicine is used to treat an ectopic pregnancy.  You may also receive other medicines to manage your ectopic pregnancy.  The procedure may vary among health care providers and hospitals.    What happens after the procedure?  After your treatment, it is common to have:  No immediate side effects.  Blood tests will be taken at timed intervals for several days or weeks to check your pregnancy hormone levels. The blood tests will continue until the pregnancy hormone can no longer be found in the blood.    Summary  Methotrexate is a medicine that treats an ectopic pregnancy. This type of pregnancy forms outside the uterus.  There is a risk that methotrexate treatment will fail and the pregnancy will continue. There is also a risk that the ectopic pregnancy might tear or burst while using this medicine.  This medicine may be given in a single dose or a series of doses over time.  After your treatment, blood tests will be done at timed intervals for several days or weeks to check your pregnancy hormone levels. The blood tests will continue until the pregnancy hormone can no longer be found in the blood.  This information is not intended to replace advice given to you by your health care provider. Make sure you discuss any questions you have with your health care provider.

## 2025-06-18 NOTE — ED PROVIDER NOTE - PHYSICAL EXAMINATION
CONSTITUTIONAL: pt in NAD   SKIN: Normal color and turgor.    HEAD: NC/AT.  EYES: Conjunctiva clear. Anicteric sclera.  ENT: Airway clear. Normal voice. MMM.  RESPIRATORY:  Normal respiratory rate and effort.  CARDIOVASCULAR:  RRR   GI:  Abdomen soft, nontender.    MSK: Neck supple.  No limb edema or muscular tenderness. No joint swelling or ROM limitation.  NEURO: Alert, clear mental status.  Speech clear. No focal deficits. Gait steady.

## 2025-06-18 NOTE — ED ADULT NURSE NOTE - OBJECTIVE STATEMENT
pt is a 35 yo F here for repeat hcg level. found to have likely ectopic, declined mtx at last visit. reports bleeding has decreased, now only brown spotting. denies any pain, f/c.  pt in nad, a&ox4, ambulatory.

## 2025-06-19 DIAGNOSIS — O00.90 UNSPECIFIED ECTOPIC PREGNANCY WITHOUT INTRAUTERINE PREGNANCY: ICD-10-CM

## 2025-06-19 DIAGNOSIS — Z88.0 ALLERGY STATUS TO PENICILLIN: ICD-10-CM

## 2025-06-19 DIAGNOSIS — O20.9 HEMORRHAGE IN EARLY PREGNANCY, UNSPECIFIED: ICD-10-CM

## 2025-06-20 DIAGNOSIS — O00.90 UNSPECIFIED ECTOPIC PREGNANCY WITHOUT INTRAUTERINE PREGNANCY: ICD-10-CM

## 2025-06-20 DIAGNOSIS — Z88.0 ALLERGY STATUS TO PENICILLIN: ICD-10-CM

## 2025-06-20 NOTE — CHART NOTE - NSCHARTNOTEFT_GEN_A_CORE
Called patient to confirm she is available to come tomorrow for her repeat bHCG. Pt confirmed she will arrive in the morning. Pt reports feeling well, w/ minimal spotting. Denies pain, dizziness, heavy bleeding at this time.

## 2025-06-21 ENCOUNTER — EMERGENCY (EMERGENCY)
Facility: HOSPITAL | Age: 35
LOS: 1 days | End: 2025-06-21
Admitting: STUDENT IN AN ORGANIZED HEALTH CARE EDUCATION/TRAINING PROGRAM
Payer: COMMERCIAL

## 2025-06-21 VITALS
OXYGEN SATURATION: 99 % | DIASTOLIC BLOOD PRESSURE: 82 MMHG | TEMPERATURE: 98 F | HEART RATE: 72 BPM | HEIGHT: 64 IN | RESPIRATION RATE: 18 BRPM | SYSTOLIC BLOOD PRESSURE: 118 MMHG

## 2025-06-21 LAB — HCG SERPL-ACNC: 96 MIU/ML — HIGH

## 2025-06-21 PROCEDURE — 99284 EMERGENCY DEPT VISIT MOD MDM: CPT

## 2025-06-21 PROCEDURE — 99283 EMERGENCY DEPT VISIT LOW MDM: CPT

## 2025-06-21 PROCEDURE — 84702 CHORIONIC GONADOTROPIN TEST: CPT

## 2025-06-21 PROCEDURE — 36415 COLL VENOUS BLD VENIPUNCTURE: CPT

## 2025-06-21 NOTE — ED PROVIDER NOTE - NSFOLLOWUPINSTRUCTIONS_ED_ALL_ED_FT
Please return to ED on 6/25/25 for repeat blood tests    Return to ED sooner if you have fever, abdominal pain, heavy vaginal bleeding (more than 1 pad per hour), dizziness, fainting or other concerns Please return to ED on Tuesday 6/24/25 for repeat blood tests    Return to ED sooner if you have fever, abdominal pain, heavy vaginal bleeding (more than 1 pad per hour), dizziness, fainting or other concerns    Ectopic Pregnancy    WHAT YOU NEED TO KNOW:    What is an ectopic pregnancy? Ectopic pregnancy occurs when a fertilized egg attaches and begins to grow outside of the uterus. The most common place for this to happen is in the fallopian tube. This is sometimes called a tubal pregnancy. The egg can also implant on the outside of the uterus, on the ovary or cervix, or in the abdomen. The egg may begin to grow, but the pregnancy cannot continue normally. Ectopic pregnancy can cause heavy bleeding and may be life-threatening.    What increases my risk for an ectopic pregnancy?    Age older than 35 years    An infection of the reproductive organs such as pelvic inflammatory disease (PID)    A past ectopic pregnancy, or past fertility problems    Fallopian tube injury or damage    Getting pregnant when you have an intrauterine device (IUD)    Past surgery in your abdomen or on your reproductive organs    Medicines to treat infertility or that contain female hormones    Certain fertility treatments, such as multiple embryo transplant    Smoking cigarettes  What are the signs and symptoms of ectopic pregnancy?    One-sided abdominal or pelvic pain and cramping    Vaginal bleeding or spotting that happens about 7 weeks after your missed period    Nausea or vomiting    Dizziness, weakness, or fainting    Tissue coming out of your vagina  How is ectopic pregnancy diagnosed? Your healthcare provider will examine you and ask about other medical conditions or past surgeries. Tell the provider about any previous pregnancies, miscarriages, infertility treatments, and sexually transmitted infections (STIs). You may need any of the following:    A pelvic exam is used to check the size and shape of your uterus, cervix, and ovaries.    Blood and urine tests will show if you are currently pregnant, or if you have infections or other problems.    Ultrasound pictures may be taken of the inside of your uterus, ovaries, and abdomen. An ultrasound is usually done over your abdomen, but you may also need a vaginal ultrasound. During a vaginal ultrasound, a small tube is placed into your vagina. This can help healthcare providers see areas that may be hard to see during an abdominal ultrasound.  How is ectopic pregnancy treated? Most ectopic pregnancies will need immediate treatment. Your body may absorb the pregnancy tissues and your symptoms may decrease without any treatment. You may need any of the following:    Medicine called methotrexate may be given to stop the pregnancy. This may be given as an injection. You may need more than one dose. It is important to follow up with your healthcare provider as directed if you receive this medicine.    Surgery may be done to repair or remove tissue or ruptured fallopian tubes. Your healthcare provider will talk to you about possible kinds of surgery. Your provider will consider where the ectopic pregnancy is located and the damage it caused. Talk to your provider about your desire to have children in the future. Some kinds of surgery will prevent future pregnancy.  Where can I get support and more information?    The [employer]  [address] [address]  [place] [address]  Phone: 1-763.544.4127  Phone: 1-389.155.9531  Web Address: http://www.acog.org  Call your local emergency number (911 in the [place]) if:    You have chest pain or trouble breathing.    Call your doctor if:    You have sharp pain in your lower abdomen that is severe and starts suddenly.    You feel lightheaded or like you are going to faint.    You have increasing abdominal or pelvic pain.    You have new or worsening vaginal bleeding.    You have shoulder pain.    You have a fever.    You have questions or concerns about your condition or care.  CARE AGREEMENT:    You have the right to help plan your care. Learn about your health condition and how it may be treated. Discuss treatment options with your healthcare providers to decide what care you want to receive. You always have the right to refuse treatment.

## 2025-06-21 NOTE — ED PROVIDER NOTE - CLINICAL SUMMARY MEDICAL DECISION MAKING FREE TEXT BOX
34 F 6wk preg, lmp 5/4 here for rpt hcg s/p MTX on 6/18 for R tubal ectopic; scant spotting and pelvic cramping. no pain or heavy bleeding.  on exam pt anxious but in nad, abd nondistended/soft, nontender, no r/g.  will rpt hcg, gyn aware and will see pt 34 F 6wk preg, lmp 5/4 here for rpt hcg s/p MTX on 6/18 for R tubal ectopic; scant spotting and pelvic cramping. no pain or heavy bleeding.  on exam pt anxious but in nad, abd nondistended/soft, nontender, no r/g.  will rpt hcg, gyn aware and will see pt    hcg downtrending, no need for sonogram per gyn.  will return on tuesday for rpt hcg

## 2025-06-21 NOTE — ED PROVIDER NOTE - PHYSICAL EXAMINATION
Gen: anxious appearing, no acute distress  Skin: warm/dry, no rash noted  Resp: breathing comfortably, speaking in full sentences, no dyspnea  Abd: nondistended/soft, nontender, no r/g  Neuro: alert/oriented, ambulatory

## 2025-06-21 NOTE — ED ADULT NURSE NOTE - OBJECTIVE STATEMENT
Patient presents to ER AOalpesh speaking in full clear sentences for repeat HCG check. pt s/p MTX. reports spotting on tissue. mild cramping onset today.

## 2025-06-21 NOTE — CONSULT NOTE ADULT - ASSESSMENT
35 yo  s/p MTX on  for suspected ectopic presents for day 4 bHCG level  -patient is hemodynamically and clinically stable, non tachycardic and abdominal exam benign  -[facility]  -patient to return on  for day 7  -strict return precautions for ruptured ectopic discussed including intense abdominal pain, shortness of breath, lightheadedness, dizziness, heavy vaginal bleeding, all questions answered and patient expressed understanding    NS PGY2

## 2025-06-21 NOTE — ED PROVIDER NOTE - OBJECTIVE STATEMENT
34 F 6wk preg, lmp 5/4 here for rpt hcg s/p MTX on 6/18 for R tubal ectopic.  pt reports occasional blood on tissue w/ wiping but no heavy bleeding.  + pelvic cramping today, no pain.  + fatigue.  denies f/c, HA, dizziness, fainting, chest pain, sob, nvd, trauma

## 2025-06-21 NOTE — ED PROVIDER NOTE - PATIENT PORTAL LINK FT
You can access the FollowMyHealth Patient Portal offered by [facility] by registering at the following website: [email or web address]. By joining [employer]’s FollowMyHealth portal, you will also be able to view your health information using other applications (apps) compatible with our system.

## 2025-06-21 NOTE — CONSULT NOTE ADULT - SUBJECTIVE AND OBJECTIVE BOX
35 yo  s/p MTX on  presents for day 4 bHCG level. Patient states she she mild abdominal cramping and spotting but otherwise  denies fever, chills, chest pain, SOB, severe abdominal pain, nausea, vomiting, heavy vaginal bleeding.    OB Hx: D&E  fetal genetic abnormalities  GYN Hx: Denies fibroids, cysts, STIs, abnormal pap smears  Last PAP smear: WNL    PMHx: Denies  SHx: Denies  Meds: Denies  Allergies: PCN- hives  Social hx: Lives with her fiance      PHYSICAL EXAM:   Vital Signs Last 24 Hrs  T(C): 36.8 (2025 10:26), Max: 36.8 (2025 10:26)  T(F): 98.3 (2025 10:26), Max: 98.3 (2025 10:26)  HR: 72 (2025 10:26) (72 - 72)  BP: 118/82 (2025 10:26) (118/82 - 118/82)  BP(mean): --  RR: 18 (2025 10:26) (18 - 18)  SpO2: 99% (2025 10:26) (99% - 99%)    Parameters below as of 2025 10:26  Patient On (Oxygen Delivery Method): room air        **************************  Constitutional: Alert & Oriented x3, No acute distress  Respiratory:  No increased WOB  Cardiovascular: VSS  Gastrointestinal: soft, non tender, no rebound or guarding   Pelvic exam: deferred  Extremities: no calf tenderness or swelling      LABS:              HCG Quantitative, Serum: 96 mIU/mL ( @ 10:55)      RADIOLOGY & ADDITIONAL STUDIES:

## 2025-06-21 NOTE — ED ADULT NURSE NOTE - IN ACCORDANCE WITH [place] STATE LAW, WE OFFER EVERY PATIENT A HEPATITIS C TEST. WOULD YOU LIKE TO BE TESTED TODAY?
Epistaxis   afrin nasal spray 1 spray ecah nostril 2 x daily for 3 dys only     Upper Respiratory Infection in Children (“The common cold”)    Your child was seen in the Emergency Department and diagnosed with an upper respiratory infection (URI), or a “common cold.”  It can affect your child's nose, throat, ears, and sinuses. Most children get about 5 to 8 colds each year. Common signs and symptoms include the following: runny or stuffy nose, sneezing and coughing, sore throat or hoarseness, red, watery, and sore eyes, tiredness or fussiness, a fever, headache, and body aches. Your child's cold symptoms will be worse for the first 3 to 5 days, but then should improve.  Fevers usually last for 1-3 days, but can last longer in some children with a URI.    General tips for taking care of a child who has a URI:   There is no cure for the common cold.  Colds are caused by viruses and THEY DO NOT GET BETTER WITH ANTIBIOTICS.  However, kids with colds are more likely to develop some bacterial infections (like ear infections), which may be treated with antibiotics. Close follow-up with your pediatrician is important if symptoms worsen or do not improve.  Most symptoms of colds in children go away without treatment in 1 to 2 weeks.    Your child may benefit from the following to help manage his or her symptoms:   -Both acetaminophen and ibuprofen both decrease fever and discomfort.  These medications are available with or without a doctor’s order.  -Rest will help his or her body get better.   -Give your child plenty of fluids.   -Clear mucus from your child's nose. Use a nasal aspirator (either an electric one or a bulb syringe) to remove mucus from a baby's nose. Squeeze the bulb and put the tip into one of your baby's nostrils. Gently close the other nostril with your finger. Slowly release the bulb to suck up the mucus. Empty the bulb syringe onto a tissue. Repeat the steps if needed. Do the same thing in the other nostril. Make sure your baby's nose is clear before he or she feeds or sleeps. You may need to put saline drops into your baby's nose if the mucus is very thick.  -Soothe your child's throat. If your child is 8 years or older, have him or her gargle with salt water. Make salt water by dissolving ¼ teaspoon salt in 1 cup warm water. You can give honey to children older than 1 year. Give ½ teaspoon of honey to children 1 to 5 years. Give 1 teaspoon of honey to children 6 to 11 years. Give 2 teaspoons of honey to children 12 or older.  -You can briefly turn on a steam shower and stay in the bathroom with steamy water running for your child to breath in the steam.  -Apply petroleum-based jelly around the outside of your child's nostrils. This can decrease irritation from blowing his or her nose.     Do NOT give:  -Over-the-counter (OTC) cough or cold medicines. Cough and cold medicines can cause side effects.  Additionally, they have never really shown to be effective.    -Aspirin: We do not recommend aspirin in any children—it can cause a serious side effect in some cases.     Prevent spread:  -Keep your child away from other people during the first 3 to 5 days of his or her cold. The virus is spread most easily during this time.   -Wash your hands and your child's hands often. Teach your child to cover his or her nose and mouth when he or she sneezes, coughs, and blows his or her nose when age appropriate. Show your child how to cough and sneeze into the crook of the elbow instead of the hands.   -Do not let your child share toys, pacifiers, or towels with others while he or she is sick.   -Do not let your child share foods, eating utensils, cups, or drinks with others while he or she is sick.    Follow up with your pediatrician in 1-2 days to make sure that your child is doing better.    Return to the Emergency Department if:  -Your child has trouble breathing or is breathing faster than usual.   -Your child's lips or nails turn blue.   -Your child's nostrils flare when he or she takes a breath.    -The skin above or below your child's ribs is sucked in with each breath.   -Your child's heart is beating much faster than usual.   -You see pinpoint or larger reddish-purple dots on your child's skin.   -Your child stops urinating or urinates much less than usual.   -Your baby's soft spot on his or her head is bulging outward or sunken inward.   -Your child has a severe headache or stiff neck.   -Your child has severe chest or stomach pain.   -Your baby is too weak to eat.     Consider calling your pediatrician if:  -Your child has had thick nasal drainage for more than 7 days.   -Your child has ear pain.   -Your child is >3 years old and has white spots on his or her tonsils.   -Your child is unable to eat, has nausea, or is vomiting.   -Your child has increased tiredness and weakness.  -Your child's symptoms do not improve or get worse after 3 days.   -You have questions or concerns about your child's condition or care. Opt out

## 2025-06-24 ENCOUNTER — EMERGENCY (EMERGENCY)
Facility: HOSPITAL | Age: 35
LOS: 1 days | End: 2025-06-24
Admitting: EMERGENCY MEDICINE
Payer: COMMERCIAL

## 2025-06-24 VITALS
TEMPERATURE: 98 F | SYSTOLIC BLOOD PRESSURE: 112 MMHG | WEIGHT: 135.14 LBS | DIASTOLIC BLOOD PRESSURE: 73 MMHG | HEART RATE: 64 BPM | OXYGEN SATURATION: 98 % | RESPIRATION RATE: 18 BRPM | HEIGHT: 64 IN

## 2025-06-24 DIAGNOSIS — R53.83 OTHER FATIGUE: ICD-10-CM

## 2025-06-24 DIAGNOSIS — Z88.0 ALLERGY STATUS TO PENICILLIN: ICD-10-CM

## 2025-06-24 DIAGNOSIS — R10.2 PELVIC AND PERINEAL PAIN: ICD-10-CM

## 2025-06-24 DIAGNOSIS — Z3A.01 LESS THAN 8 WEEKS GESTATION OF PREGNANCY: ICD-10-CM

## 2025-06-24 DIAGNOSIS — O99.891 OTHER SPECIFIED DISEASES AND CONDITIONS COMPLICATING PREGNANCY: ICD-10-CM

## 2025-06-24 LAB
ALBUMIN SERPL ELPH-MCNC: 4.4 G/DL — SIGNIFICANT CHANGE UP (ref 3.3–5)
ALP SERPL-CCNC: 43 U/L — SIGNIFICANT CHANGE UP (ref 40–120)
ALT FLD-CCNC: 11 U/L — SIGNIFICANT CHANGE UP (ref 10–45)
ANION GAP SERPL CALC-SCNC: 9 MMOL/L — SIGNIFICANT CHANGE UP (ref 5–17)
AST SERPL-CCNC: 15 U/L — SIGNIFICANT CHANGE UP (ref 10–40)
BILIRUB SERPL-MCNC: 0.4 MG/DL — SIGNIFICANT CHANGE UP (ref 0.2–1.2)
BUN SERPL-MCNC: 13 MG/DL — SIGNIFICANT CHANGE UP (ref 7–23)
CALCIUM SERPL-MCNC: 9.1 MG/DL — SIGNIFICANT CHANGE UP (ref 8.4–10.5)
CHLORIDE SERPL-SCNC: 103 MMOL/L — SIGNIFICANT CHANGE UP (ref 96–108)
CO2 SERPL-SCNC: 24 MMOL/L — SIGNIFICANT CHANGE UP (ref 22–31)
CREAT SERPL-MCNC: 0.64 MG/DL — SIGNIFICANT CHANGE UP (ref 0.5–1.3)
EGFR: 120 ML/MIN/1.73M2 — SIGNIFICANT CHANGE UP
EGFR: 120 ML/MIN/1.73M2 — SIGNIFICANT CHANGE UP
GLUCOSE SERPL-MCNC: 82 MG/DL — SIGNIFICANT CHANGE UP (ref 70–99)
HCG SERPL-ACNC: 65 MIU/ML — HIGH
HCT VFR BLD CALC: 36.7 % — SIGNIFICANT CHANGE UP (ref 34.5–45)
HGB BLD-MCNC: 11.8 G/DL — SIGNIFICANT CHANGE UP (ref 11.5–15.5)
MCHC RBC-ENTMCNC: 26.5 PG — LOW (ref 27–34)
MCHC RBC-ENTMCNC: 32.2 G/DL — SIGNIFICANT CHANGE UP (ref 32–36)
MCV RBC AUTO: 82.5 FL — SIGNIFICANT CHANGE UP (ref 80–100)
NRBC # BLD AUTO: 0 K/UL — SIGNIFICANT CHANGE UP (ref 0–0)
NRBC # FLD: 0 K/UL — SIGNIFICANT CHANGE UP (ref 0–0)
NRBC BLD AUTO-RTO: 0 /100 WBCS — SIGNIFICANT CHANGE UP (ref 0–0)
PLATELET # BLD AUTO: 172 K/UL — SIGNIFICANT CHANGE UP (ref 150–400)
PMV BLD: 12 FL — SIGNIFICANT CHANGE UP (ref 7–13)
POTASSIUM SERPL-MCNC: 4.4 MMOL/L — SIGNIFICANT CHANGE UP (ref 3.5–5.3)
POTASSIUM SERPL-SCNC: 4.4 MMOL/L — SIGNIFICANT CHANGE UP (ref 3.5–5.3)
PROT SERPL-MCNC: 6.9 G/DL — SIGNIFICANT CHANGE UP (ref 6–8.3)
RBC # BLD: 4.45 M/UL — SIGNIFICANT CHANGE UP (ref 3.8–5.2)
RBC # FLD: 12.8 % — SIGNIFICANT CHANGE UP (ref 10.3–14.5)
SODIUM SERPL-SCNC: 136 MMOL/L — SIGNIFICANT CHANGE UP (ref 135–145)
WBC # BLD: 5.07 K/UL — SIGNIFICANT CHANGE UP (ref 3.8–10.5)
WBC # FLD AUTO: 5.07 K/UL — SIGNIFICANT CHANGE UP (ref 3.8–10.5)

## 2025-06-24 PROCEDURE — 85027 COMPLETE CBC AUTOMATED: CPT

## 2025-06-24 PROCEDURE — 80053 COMPREHEN METABOLIC PANEL: CPT

## 2025-06-24 PROCEDURE — 99284 EMERGENCY DEPT VISIT MOD MDM: CPT

## 2025-06-24 PROCEDURE — 99285 EMERGENCY DEPT VISIT HI MDM: CPT

## 2025-06-24 PROCEDURE — 36415 COLL VENOUS BLD VENIPUNCTURE: CPT

## 2025-06-24 PROCEDURE — 84702 CHORIONIC GONADOTROPIN TEST: CPT

## 2025-06-24 NOTE — CONSULT NOTE ADULT - ASSESSMENT
Patient is a 34 year old  at 7w2d by LMP (25) presenting for her day 7 bHCG lab draw (s/p MTX). VSS. bHCG trend as follows: 165 () > 106 () > 94 ( evening)>135 ()>96 ()>65 (). Drop from day 4 to day 7 was 32%, greater than the required 15%. Rh+, no rhogam required. Patient to follow up outpatient weekly until bHCG reaches 0.   [ ] Pt was counseled on continuing to avoid folic acid containing foods, prenatal vitamins, alcohol, breastfeeding, as well as intercourse. Pt understands she cannot attempt to conceive for at least 3 months.   [] Pt counseled on return precautions including (but not limited to) heavy vaginal bleeding, sharp abdominal/pelvic pain, dizziness, and syncope  [] Pt to follow up on  in the office for repeat bHCG check.      Plan discussed w/ Dr. Tho Ramirez PA-C

## 2025-06-24 NOTE — ED PROVIDER NOTE - PHYSICAL EXAMINATION
Gen: well appearing, no acute distress  Skin: warm/dry, no rash noted  Resp: breathing comfortably, speaking in full sentences, no dyspnea  Abd: nondistended, soft, nontender, no r/g  Neuro: alert/oriented, ambulatory

## 2025-06-24 NOTE — ED PROVIDER NOTE - PATIENT PORTAL LINK FT
You can access the FollowMyHealth Patient Portal offered by [facility] by registering at the following website: [email or web address]. By joining [employer]’s FollowMyHealth portal, you will also be able to view your health information using other applications (apps) compatible with our system.

## 2025-06-24 NOTE — ED PROVIDER NOTE - OBJECTIVE STATEMENT
34 F here for day7 hcg check s/p mtx for R ectopic pregnancy on 6/18.  pt reports mild spotting that started today but no pain.  denies f/c, HA, dizziness, chest pain, sob, abd pain, nvd, urinary sxs, trauma

## 2025-06-24 NOTE — ED PROVIDER NOTE - CLINICAL SUMMARY MEDICAL DECISION MAKING FREE TEXT BOX
34 F here for day7 hcg check s/p mtx for R ectopic pregnancy on 6/18.  reports some spotting this morning but no pain.  on exam vss, well appearing, abd soft, nontender, no r/g.  will rpt hcg and gyn aware of pt and will eval 34 F here for day7 hcg check s/p mtx for R ectopic pregnancy on 6/18.  reports some spotting this morning but no pain.  on exam vss, well appearing, abd soft, nontender, no r/g.  will rpt hcg and gyn aware of pt and will eval    downtrending hcg, seen by gyn, no imaging or rpt mtx needed.  will f/u outpt in one week with her OB.  discussed strict return parameters

## 2025-06-24 NOTE — CONSULT NOTE ADULT - SUBJECTIVE AND OBJECTIVE BOX
Patient is a 34 year old  at 7w2d by LMP (25) presenting for her day 7 [facility] lab draw (s/p MTX). Pt reports feeling well since her day 4 check. Pt reports some cramping and spotting on Saturday that has since resolved.   Pt denies fever, chills, chest pain, SOB, abdominal pain, nausea, vomiting, vaginal bleeding, dizziness, SOB, CP, dysuria, hematuria      OB Hx: D&E  fetal genetic abnormalities  GYN Hx: Denies fibroids, cysts, STIs, abnormal pap smears  Last PAP smear: WNL    PMHx: Denies  SHx: Denies  Meds: Denies  Allergies: PCN- hives    Social hx: Lives with her fiance    PHYSICAL EXAM:   Vital Signs Last 24 Hrs  T(C): 36.9 (2025 08:49), Max: 36.9 (2025 08:49)  T(F): 98.4 (2025 08:49), Max: 98.4 (2025 08:49)  HR: 64 (2025 08:49) (64 - 64)  BP: 112/73 (2025 08:49) (112/73 - 112/73)  BP(mean): --  RR: 18 (2025 08:49) (18 - 18)  SpO2: 98% (2025 08:49) (98% - 98%)    Parameters below as of 2025 08:49  Patient On (Oxygen Delivery Method): room air        **************************  Constitutional: Alert & Oriented x3, No acute distress  Gastrointestinal: soft, non tender, positive bowel sounds, no rebound or guarding   Extremities: no calf tenderness or swelling    LABS:                        11.8   5.07  )-----------( 172      ( 2025 09:16 )             36.7     -    136  |  103  |  13  ----------------------------<  82  4.4   |  24  |  0.64    Ca    9.1      2025 09:16    TPro  6.9  /  Alb  4.4  /  TBili  0.4  /  DBili  x   /  AST  15  /  ALT  11  /  AlkPhos  43        Urinalysis Basic - ( 2025 09:16 )    Color: x / Appearance: x / SG: x / pH: x  Gluc: 82 mg/dL / Ketone: x  / Bili: x / Urobili: x   Blood: x / Protein: x / Nitrite: x   Leuk Esterase: x / RBC: x / WBC x   Sq Epi: x / Non Sq Epi: x / Bacteria: x      HCG Quantitative, Serum: 65 mIU/mL ( @ 09:16)      RADIOLOGY & ADDITIONAL STUDIES:      ----------------------------------------------------------------------   Gynecological Report               (Signed Final 2025 04:09 pm)  ----------------------------------------------------------------------  Patient Info     ID #:       [id number]                      :  [birth year] (34 yrs)(F)   Name:       [facility]                    Visit Date: 2025 03:32 pm               VEZYRAKIS  ----------------------------------------------------------------------  Performed By     Attending:        Roosevelt Patel M.D.   Performed By:     Letitia Dooley RDMS   Referred By:      Kim Sandoval MD   Ref. Address:     [facility]   Location:         [facility]   Visit Type:       GYN  ----------------------------------------------------------------------  Service(s) Provided     GYN   Transabdominal + Transvaginal  ----------------------------------------------------------------------  Indications     Rule Out Ectopic - Bleeding/Hemorrhage         O20.9   Occuring in Early Pregnancy  ----------------------------------------------------------------------  History     Age:   34   LMP:   25            Day Of Cycle:   44  ----------------------------------------------------------------------  Uterus     Uterus:     Present   Position:   Anteverted   Size (cm)    L:  5.35      W:   4.77       H:  4.27   Description:   Normal appearance  ----------------------------------------------------------------------  Endometrium     Endometrium:          Normal appearance   Thickness(mm):        8.13   Echotexture:          Homogeneous    ----------------------------------------------------------------------  Cervix     Normal appearance  ----------------------------------------------------------------------  Cul-De-Sac     No fluid was visualized  ----------------------------------------------------------------------  Right Ovary     Status:   Visualized   Size (cm)    L:  2.81      W:   2.76       H:  1.47   Vol (ml):    5.97   Morphology:    Normal appearance  ----------------------------------------------------------------------  Left Ovary     Status:   Visualized   Size (cm)    L:  3.41      W:   3.06       H:  1.62   Vol (ml):    8.85   Morphology:    Normal appearance  ----------------------------------------------------------------------  Comments     Transabdominal and transvaginal ultrasound of the   pelvis were performed to assure adequate visualization   of the pelvic organs.   The uterine cervix appears normal with no suspected   pathology.   A normal size anteverted  uterus was noted.  The   myometrium has homogeneous echotexture.  It is free   of leiomyomas or any other lesions.   The endometrial lining is measuring 8.1 cm. There is   no evidence of IUP.     The left ovary appears normal, with a corpus luteum.   The right ovary appears normal, with multiple small   follicles. Adjacent to the right ovary there is an   echogenic masses  noted measuring 1.6 x 1.4 x 1.7 cm   (no yolk sac or fetal pole was seen within). It could be    from wale ovary and appears medially to it.   Doppler studies revealed circumferential flow. This is   highly suspicious of a right tubal ectopic pregnancy.   There is no free fluid in the cul de sac.     Reconstruction of the coronal uterine plane was   performed using 3D ultrasound demonstrating a normal   uterine cavity.  ----------------------------------------------------------------------  Diagnosis     Suspected ectopic                                   Discussed with    pregnancy in right tube                             [place] and the                                                       patient who was                                                       escorted to ER for                                                       further assessment                                                       and treatment  ----------------------------------------------------------------------                     Roosevelt Patel  Electronically Signed Final Report   2025 04:09 pm  ----------------------------------------------------------------------

## 2025-06-24 NOTE — ED ADULT NURSE NOTE - NSFALLUNIVINTERV_ED_ALL_ED
Bed/Stretcher in lowest position, wheels locked, appropriate side rails in place/Call bell, personal items and telephone in reach/Instruct patient to call for assistance before getting out of bed/chair/stretcher/Non-slip footwear applied when patient is off stretcher/[place] to call system/Physically safe environment - no spills, clutter or unnecessary equipment/Purposeful proactive rounding/Room/bathroom lighting operational, light cord in reach

## 2025-06-24 NOTE — ED PROVIDER NOTE - NSFOLLOWUPINSTRUCTIONS_ED_ALL_ED_FT
Please follow up with OBGYN in one week for repeat blood tests    Return to ED if you have fevers, abdominal pain, heavy bleeding (more than 1 pad per hour), dizziness, fainting    Ectopic Pregnancy    WHAT YOU NEED TO KNOW:    What is an ectopic pregnancy? Ectopic pregnancy occurs when a fertilized egg attaches and begins to grow outside of the uterus. The most common place for this to happen is in the fallopian tube. This is sometimes called a tubal pregnancy. The egg can also implant on the outside of the uterus, on the ovary or cervix, or in the abdomen. The egg may begin to grow, but the pregnancy cannot continue normally. Ectopic pregnancy can cause heavy bleeding and may be life-threatening.    What increases my risk for an ectopic pregnancy?    Age older than 35 years    An infection of the reproductive organs such as pelvic inflammatory disease (PID)    A past ectopic pregnancy, or past fertility problems    Fallopian tube injury or damage    Getting pregnant when you have an intrauterine device (IUD)    Past surgery in your abdomen or on your reproductive organs    Medicines to treat infertility or that contain female hormones    Certain fertility treatments, such as multiple embryo transplant    Smoking cigarettes  What are the signs and symptoms of ectopic pregnancy?    One-sided abdominal or pelvic pain and cramping    Vaginal bleeding or spotting that happens about 7 weeks after your missed period    Nausea or vomiting    Dizziness, weakness, or fainting    Tissue coming out of your vagina  How is ectopic pregnancy diagnosed? Your healthcare provider will examine you and ask about other medical conditions or past surgeries. Tell the provider about any previous pregnancies, miscarriages, infertility treatments, and sexually transmitted infections (STIs). You may need any of the following:    A pelvic exam is used to check the size and shape of your uterus, cervix, and ovaries.    Blood and urine tests will show if you are currently pregnant, or if you have infections or other problems.    Ultrasound pictures may be taken of the inside of your uterus, ovaries, and abdomen. An ultrasound is usually done over your abdomen, but you may also need a vaginal ultrasound. During a vaginal ultrasound, a small tube is placed into your vagina. This can help healthcare providers see areas that may be hard to see during an abdominal ultrasound.  How is ectopic pregnancy treated? Most ectopic pregnancies will need immediate treatment. Your body may absorb the pregnancy tissues and your symptoms may decrease without any treatment. You may need any of the following:    Medicine called methotrexate may be given to stop the pregnancy. This may be given as an injection. You may need more than one dose. It is important to follow up with your healthcare provider as directed if you receive this medicine.    Surgery may be done to repair or remove tissue or ruptured fallopian tubes. Your healthcare provider will talk to you about possible kinds of surgery. Your provider will consider where the ectopic pregnancy is located and the damage it caused. Talk to your provider about your desire to have children in the future. Some kinds of surgery will prevent future pregnancy.  Where can I get support and more information?    The [employer]  [address] [address]  [place] [address]  Phone: [phone]  Phone: 1-539.539.2823  Web Address: http://www.ac.org  Call your local emergency number (911 in the [place]) if:    You have chest pain or trouble breathing.    Call your doctor if:    You have sharp pain in your lower abdomen that is severe and starts suddenly.    You feel lightheaded or like you are going to faint.    You have increasing abdominal or pelvic pain.    You have new or worsening vaginal bleeding.    You have shoulder pain.    You have a fever.    You have questions or concerns about your condition or care.  CARE AGREEMENT:    You have the right to help plan your care. Learn about your health condition and how it may be treated. Discuss treatment options with your healthcare providers to decide what care you want to receive. You always have the right to refuse treatment. Please follow up with OBGYN in one week on 7/1/25 for repeat blood tests    Return to ED if you have fevers, abdominal pain, heavy bleeding (more than 1 pad per hour), dizziness, fainting    Ectopic Pregnancy    WHAT YOU NEED TO KNOW:    What is an ectopic pregnancy? Ectopic pregnancy occurs when a fertilized egg attaches and begins to grow outside of the uterus. The most common place for this to happen is in the fallopian tube. This is sometimes called a tubal pregnancy. The egg can also implant on the outside of the uterus, on the ovary or cervix, or in the abdomen. The egg may begin to grow, but the pregnancy cannot continue normally. Ectopic pregnancy can cause heavy bleeding and may be life-threatening.    What increases my risk for an ectopic pregnancy?    Age older than 35 years    An infection of the reproductive organs such as pelvic inflammatory disease (PID)    A past ectopic pregnancy, or past fertility problems    Fallopian tube injury or damage    Getting pregnant when you have an intrauterine device (IUD)    Past surgery in your abdomen or on your reproductive organs    Medicines to treat infertility or that contain female hormones    Certain fertility treatments, such as multiple embryo transplant    Smoking cigarettes  What are the signs and symptoms of ectopic pregnancy?    One-sided abdominal or pelvic pain and cramping    Vaginal bleeding or spotting that happens about 7 weeks after your missed period    Nausea or vomiting    Dizziness, weakness, or fainting    Tissue coming out of your vagina  How is ectopic pregnancy diagnosed? Your healthcare provider will examine you and ask about other medical conditions or past surgeries. Tell the provider about any previous pregnancies, miscarriages, infertility treatments, and sexually transmitted infections (STIs). You may need any of the following:    A pelvic exam is used to check the size and shape of your uterus, cervix, and ovaries.    Blood and urine tests will show if you are currently pregnant, or if you have infections or other problems.    Ultrasound pictures may be taken of the inside of your uterus, ovaries, and abdomen. An ultrasound is usually done over your abdomen, but you may also need a vaginal ultrasound. During a vaginal ultrasound, a small tube is placed into your vagina. This can help healthcare providers see areas that may be hard to see during an abdominal ultrasound.  How is ectopic pregnancy treated? Most ectopic pregnancies will need immediate treatment. Your body may absorb the pregnancy tissues and your symptoms may decrease without any treatment. You may need any of the following:    Medicine called methotrexate may be given to stop the pregnancy. This may be given as an injection. You may need more than one dose. It is important to follow up with your healthcare provider as directed if you receive this medicine.    Surgery may be done to repair or remove tissue or ruptured fallopian tubes. Your healthcare provider will talk to you about possible kinds of surgery. Your provider will consider where the ectopic pregnancy is located and the damage it caused. Talk to your provider about your desire to have children in the future. Some kinds of surgery will prevent future pregnancy.  Where can I get support and more information?    The [employer]  [address] [address]  [place] [address]  Phone: [phone]  Phone: 1-845.631.1278  Web Address: http://www.acog.org  Call your local emergency number (911 in the [place]) if:    You have chest pain or trouble breathing.    Call your doctor if:    You have sharp pain in your lower abdomen that is severe and starts suddenly.    You feel lightheaded or like you are going to faint.    You have increasing abdominal or pelvic pain.    You have new or worsening vaginal bleeding.    You have shoulder pain.    You have a fever.    You have questions or concerns about your condition or care.  CARE AGREEMENT:    You have the right to help plan your care. Learn about your health condition and how it may be treated. Discuss treatment options with your healthcare providers to decide what care you want to receive. You always have the right to refuse treatment.

## 2025-06-24 NOTE — ED ADULT NURSE NOTE - OBJECTIVE STATEMENT
pt w/ recent ectopic pregnancy told to return to ER today for repeat beta HCG levels. c/o mild cramping and spotting.

## 2025-06-26 DIAGNOSIS — Z88.0 ALLERGY STATUS TO PENICILLIN: ICD-10-CM

## 2025-06-26 DIAGNOSIS — O00.90 UNSPECIFIED ECTOPIC PREGNANCY WITHOUT INTRAUTERINE PREGNANCY: ICD-10-CM

## 2025-07-02 NOTE — CHART NOTE - NSCHARTNOTEFT_GEN_A_CORE
Patient seen at attending office for day 14 [facility] [place] it is 10. Gyn signing off at this time.    NS [id number]